# Patient Record
Sex: FEMALE | Race: WHITE | Employment: OTHER | URBAN - METROPOLITAN AREA
[De-identification: names, ages, dates, MRNs, and addresses within clinical notes are randomized per-mention and may not be internally consistent; named-entity substitution may affect disease eponyms.]

---

## 2021-03-08 ENCOUNTER — TRANSCRIBE ORDER (OUTPATIENT)
Dept: SCHEDULING | Age: 41
End: 2021-03-08

## 2021-03-08 DIAGNOSIS — Z12.31 SCREENING MAMMOGRAM FOR HIGH-RISK PATIENT: Primary | ICD-10-CM

## 2021-04-23 ENCOUNTER — HOSPITAL ENCOUNTER (OUTPATIENT)
Dept: MAMMOGRAPHY | Age: 41
Discharge: HOME | End: 2021-04-23
Payer: COMMERCIAL

## 2021-04-23 DIAGNOSIS — Z12.31 SCREENING MAMMOGRAM FOR HIGH-RISK PATIENT: ICD-10-CM

## 2021-04-23 PROCEDURE — 77067 SCR MAMMO BI INCL CAD: CPT

## 2022-04-18 ENCOUNTER — TRANSCRIBE ORDER (OUTPATIENT)
Dept: SCHEDULING | Age: 42
End: 2022-04-18

## 2022-04-18 DIAGNOSIS — Z12.31 VISIT FOR SCREENING MAMMOGRAM: Primary | ICD-10-CM

## 2022-06-13 ENCOUNTER — HOSPITAL ENCOUNTER (OUTPATIENT)
Dept: MAMMOGRAPHY | Age: 42
Discharge: HOME | End: 2022-06-13
Payer: COMMERCIAL

## 2022-06-13 DIAGNOSIS — Z12.31 VISIT FOR SCREENING MAMMOGRAM: ICD-10-CM

## 2022-06-13 PROCEDURE — 77067 SCR MAMMO BI INCL CAD: CPT

## 2023-07-14 ENCOUNTER — HOSPITAL ENCOUNTER (OUTPATIENT)
Facility: HOSPITAL | Age: 43
Discharge: HOME | End: 2023-07-14
Payer: COMMERCIAL

## 2023-07-14 DIAGNOSIS — Z12.31 VISIT FOR SCREENING MAMMOGRAM: ICD-10-CM

## 2023-07-14 PROCEDURE — 77063 BREAST TOMOSYNTHESIS BI: CPT

## 2023-09-29 ENCOUNTER — HOSPITAL ENCOUNTER (OUTPATIENT)
Facility: HOSPITAL | Age: 43
Setting detail: RECURRING SERIES
End: 2023-09-29
Payer: COMMERCIAL

## 2023-09-29 PROCEDURE — 97112 NEUROMUSCULAR REEDUCATION: CPT

## 2023-09-29 PROCEDURE — 97162 PT EVAL MOD COMPLEX 30 MIN: CPT

## 2023-09-29 PROCEDURE — 97535 SELF CARE MNGMENT TRAINING: CPT

## 2023-09-29 NOTE — THERAPY EVALUATION
activity limitation and / or participation in recreation  ;Presentation:  MEDIUM Complexity : Evolving with changing characteristics  ; Clinical Decision Making:  MEDIUM Complexity : FOTO score of 26-74 Overall Complexity Rating: MEDIUM  Problem List: decrease strength, impaired gait/balance, decrease ADL/functional abilities, and decrease activity tolerance   Treatment Plan may include any combination of the followin Therapeutic Exercise, 41059 Neuromuscular Re-Education, 93711 Manual Therapy, 73916 Therapeutic Activity, 01577 Self Care/Home Management, and 21463 Gait Training  Patient / Family readiness to learn indicated by: asking questions, trying to perform skills, and return verbalization   Persons(s) to be included in education: patient (P)  Barriers to Learning/Limitations: none  Measures taken if barriers to learning present: na  Patient Self Reported Health Status: good  Rehabilitation Potential: good    Short Term Goals: To be accomplished in 4 treatments. Patient will be independent with a progressive home exercise program without needed v.c. Patient will complete and present to PT a 72 hour bladder diary for analysis and establishment of appropriate intervention for remaining visits  Patient will be independent with bladder retraining techniques including pee symphony and KNACK to improve ADRIENNE by 50% per subjective report. Long Term Goals: To be accomplished in 12 treatments. Patient will demonstrate no loss of urine with cough/sneeze as performed in clinical setting  Patient will improve pelvic coordination and stability demonstrated throughout - Trendelenburg sign to improve tolerance for 30 minutes of exercise class without leakage. Patient will demonstrate 5/5 BLE strength to allow for home cleaning skills independently and without rest breaks needed    Frequency / Duration: Patient to be seen 1 time per week for 12 treatments.     Patient/ Caregiver education and instruction: Diagnosis, prognosis, self care, activity modification, and exercises   [x]  Plan of care has been reviewed with BRENDA Pabon PT, DPT       9/29/2023       9:28 AM    ===================================================================  I certify that the above Therapy Services are being furnished while the patient is under my care. I agree with the treatment plan and certify that this therapy is necessary. Physician's Signature:_________________________   DATE:_________   TIME:________                           Referral, Self    Son Hernandez MD    ** Signature, Date and Time must be completed for valid certification **  Please sign and fax to [phone].   Thank you

## 2023-10-03 ENCOUNTER — HOSPITAL ENCOUNTER (OUTPATIENT)
Facility: HOSPITAL | Age: 43
Setting detail: RECURRING SERIES
Discharge: HOME | End: 2023-10-06
Payer: COMMERCIAL

## 2023-10-03 PROCEDURE — 97112 NEUROMUSCULAR REEDUCATION: CPT

## 2023-10-03 PROCEDURE — 97535 SELF CARE MNGMENT TRAINING: CPT

## 2023-10-03 NOTE — PROGRESS NOTES
individual muscles and awareness of position of extremities in order to progress to PLOF and address remaining functional goals. (see flow sheet as applicable)     Details if applicable:                    41 41    Total Total     [x]  Patient Education billed concurrently with other procedures   [x] Review HEP    [] Progressed/Changed HEP, detail:    [] Other detail:       Other Objective/Functional Measures    Pain Level at end of session (0-10 scale): 0    Assessment     Min cues for form throughout. Some instability with quadruped arm lift but improved with repetitions. Updated HEP provided. Agreeable to continue urge drills and knack with intentional relaxation. Patient will continue to benefit from skilled PT / OT services to modify and progress therapeutic interventions, analyze and address functional mobility deficits, analyze and address strength deficits, analyze and cue for proper movement patterns, and instruct in home and community integration to address functional deficits and attain remaining goals. Progress toward goals / Updated goals:  []  See Progress Note/Recertification    Short Term Goals: To be accomplished in 4 treatments. Patient will be independent with a progressive home exercise program without needed v.c. Patient will complete and present to PT a 72 hour bladder diary for analysis and establishment of appropriate intervention for remaining visits  Patient will be independent with bladder retraining techniques including pee symphony and KNACK to improve ADRIENNE by 50% per subjective report. Long Term Goals: To be accomplished in 12 treatments. Patient will demonstrate no loss of urine with cough/sneeze as performed in clinical setting  Patient will improve pelvic coordination and stability demonstrated throughout - Trendelenburg sign to improve tolerance for 30 minutes of exercise class without leakage.     Patient will demonstrate 5/5 BLE strength to allow for home cleaning

## 2023-10-18 ENCOUNTER — HOSPITAL ENCOUNTER (OUTPATIENT)
Facility: HOSPITAL | Age: 43
Setting detail: RECURRING SERIES
Discharge: HOME | End: 2023-10-21
Payer: COMMERCIAL

## 2023-10-18 PROCEDURE — 97535 SELF CARE MNGMENT TRAINING: CPT

## 2023-10-18 PROCEDURE — 97112 NEUROMUSCULAR REEDUCATION: CPT

## 2023-10-18 NOTE — PROGRESS NOTES
PHYSICAL THERAPY - MEDICARE DAILY TREATMENT NOTE (updated 3/23)    Date: 10/18/2023        Patient Name:  Irish Carranza :  1980   Medical   Diagnosis:  Urinary incontinence [R32] Treatment Diagnosis:  M62.838  OTHER MUSCLE WEAKNESS and N39.46  MIXED INCONTINENCE    Referral Source:  Referral, Self Insurance:   Payor: Stanley Pace / Plan: Gio Jose / Product Type: *No Product type* /                   Patient  verified yes     Visit #   Current  / Total 3 4   Time   In / Out 948a 1031a   Total Treatment Time 43   Total Timed Codes 43   1:1 Treatment Time 37      [facility] Totals Reminder:  bill using total billable   min of TIMED therapeutic procedures and modalities. 8-22 min = 1 unit; 23-37 min = 2 units; 38-52 min = 3 units; 53-67 min = 4 units; 68-82 min = 5 units        SUBJECTIVE    Pain Level (0-10 scale): 0    Any medication changes, allergies to medications, adverse drug reactions, diagnosis change, or new procedure performed?: [x] No    [] Yes (see summary sheet for update)  Medications: Verified on Patient Summary List    Subjective functional status/changes:       Notices herself thinking about breathing more often. Leaking on 13-15 days of cycle. Jumping in LUCIANA still towards the end of a class. Not noticing any differences Legs still feel tight even with stretches. Tried heel raises and feels they are more effective than kegels. OBJECTIVE    Therapeutic Procedures: Tx Min Billable or 1:1 Min (if diff from Tx Min) Procedure, Rationale, Specifics   10 10 39759 Self Care/Home Management (timed):  improve patient knowledge and understanding of pain reducing techniques, posture/ergonomics, activity modification, diagnosis/prognosis, and physical therapy expectations, procedures and progression  to improve patient's ability to progress to PLOF and address remaining functional goals.   (see flow sheet as applicable)     Details if applicable:  checkins   [phone] [id number] Neuromuscular

## 2023-11-01 ENCOUNTER — HOSPITAL ENCOUNTER (OUTPATIENT)
Facility: HOSPITAL | Age: 43
Setting detail: RECURRING SERIES
Discharge: HOME | End: 2023-11-04
Payer: COMMERCIAL

## 2023-11-01 PROCEDURE — 97535 SELF CARE MNGMENT TRAINING: CPT

## 2023-11-01 PROCEDURE — 97112 NEUROMUSCULAR REEDUCATION: CPT

## 2023-11-01 NOTE — PROGRESS NOTES
PHYSICAL THERAPY - MEDICARE DAILY TREATMENT NOTE (updated 3/23)    Date: 2023        Patient Name:  Shivani Hernandez :  1980   Medical   Diagnosis:  Urinary incontinence [R32] Treatment Diagnosis:  M62.838  OTHER MUSCLE WEAKNESS and N39.46  MIXED INCONTINENCE    Referral Source:  Referral, Self Insurance:   Payor: [place] / Plan: Jerry Beverage / Product Type: *No Product type* /                   Patient  verified yes     Visit #   Current  / Total 4 7   Time   In / Out 950a 1034a   Total Treatment Time 44   Total Timed Codes 44   1:1 Treatment Time 40      [facility] Totals Reminder:  bill using total billable   min of TIMED therapeutic procedures and modalities. 8-22 min = 1 unit; 23-37 min = 2 units; 38-52 min = 3 units; 53-67 min = 4 units; 68-82 min = 5 units        SUBJECTIVE    Pain Level (0-10 scale): 0    Any medication changes, allergies to medications, adverse drug reactions, diagnosis change, or new procedure performed?: [x] No    [] Yes (see summary sheet for update)  Medications: Verified on Patient Summary List    Subjective functional status/changes:       Double jumping is still causing leakage. Lost her bladder after waiting 5 hours and went into the bathroom then again less amount. Able to do check ins successfully. Stretches are easier. OBJECTIVE    Therapeutic Procedures: Tx Min Billable or 1:1 Min (if diff from Tx Min) Procedure, Rationale, Specifics   10 10 08117 Self Care/Home Management (timed):  improve patient knowledge and understanding of pain reducing techniques, posture/ergonomics, activity modification, diagnosis/prognosis, and physical therapy expectations, procedures and progression  to improve patient's ability to progress to PLOF and address remaining functional goals.   (see flow sheet as applicable)     Details if applicable: bladder retraining, urge drills, pelvic brace   34 90 40102 Neuromuscular Re-Education (timed):  improve balance,

## 2023-11-08 ENCOUNTER — HOSPITAL ENCOUNTER (OUTPATIENT)
Facility: HOSPITAL | Age: 43
Setting detail: RECURRING SERIES
Discharge: HOME | End: 2023-11-11
Payer: COMMERCIAL

## 2023-11-08 PROCEDURE — 97112 NEUROMUSCULAR REEDUCATION: CPT

## 2023-11-08 NOTE — PROGRESS NOTES
to allow for home cleaning skills independently and without rest breaks needed    PLAN  Yes  Continue plan of care  Re-Cert Due: na  [x]  Upgrade activities as tolerated  []  Discharge due to :  []  Other:    Rebecca Lima, PT, DPT       11/8/2023       9:49 AM

## 2023-11-30 ENCOUNTER — HOSPITAL ENCOUNTER (OUTPATIENT)
Facility: HOSPITAL | Age: 43
Setting detail: RECURRING SERIES
End: 2023-11-30
Payer: COMMERCIAL

## 2023-11-30 PROCEDURE — 97112 NEUROMUSCULAR REEDUCATION: CPT

## 2023-11-30 PROCEDURE — 97535 SELF CARE MNGMENT TRAINING: CPT

## 2023-11-30 NOTE — PROGRESS NOTES
PHYSICAL THERAPY - MEDICARE DAILY TREATMENT NOTE (updated 3/23)    Date: 2023        Patient Name:  Tanisha Williamson :  1980   Medical   Diagnosis:  Urinary incontinence [R32] Treatment Diagnosis:  M62.838  OTHER MUSCLE WEAKNESS and N39.46  MIXED INCONTINENCE    Referral Source:  Rosine Scheuermann* Insurance:   Payor: Taiwo Colbert / Plan: Titi Nazario / Product Type: *No Product type* /                   Patient  verified yes     Visit #   Current  / Total 6 awaiting   Time   In / Out 1111a 1154a   Total Treatment Time 43   Total Timed Codes 43   1:1 Treatment Time 37      [facility] Totals Reminder:  bill using total billable   min of TIMED therapeutic procedures and modalities. 8-22 min = 1 unit; 23-37 min = 2 units; 38-52 min = 3 units; 53-67 min = 4 units; 68-82 min = 5 units        SUBJECTIVE    Pain Level (0-10 scale): 0    Any medication changes, allergies to medications, adverse drug reactions, diagnosis change, or new procedure performed?: [x] No    [] Yes (see summary sheet for update)  Medications: Verified on Patient Summary List    Subjective functional status/changes:       Had trouble with quadruped activity. Had 1 instance of ADRIENNE. No other bladder problems. Wont sit to pee unless she's in her bathroom. OBJECTIVE    Therapeutic Procedures: Tx Min Billable or 1:1 Min (if diff from Tx Min) Procedure, Rationale, Specifics   15 15 91325 Self Care/Home Management (timed):  improve patient knowledge and understanding of pain reducing techniques, posture/ergonomics, activity modification, diagnosis/prognosis, and physical therapy expectations, procedures and progression  to improve patient's ability to progress to PLOF and address remaining functional goals.   (see flow sheet as applicable)     Details if applicable: bladder retraining- safe exercise progressions (breath)   28 02 07182 Neuromuscular Re-Education (timed):  improve balance, coordination, kinesthetic sense, with a progressive home exercise program without needed v.c.  - MET  Patient will complete and present to PT a 72 hour bladder diary for analysis and establishment of appropriate intervention for remaining visits- NT  Patient will be independent with bladder retraining techniques including pee symphony and KNACK to improve ADRIENNE by 50% per subjective report. - IN PROGRESS      Long Term Goals: To be accomplished in 12 treatments.   Patient will demonstrate no loss of urine with cough/sneeze as performed in clinical setting- IN PROGRESS  Patient will improve pelvic coordination and stability demonstrated throughout (-) Trendelenburg sign to improve tolerance for 30 minutes of exercise class without leakage. - MET  Patient will demonstrate 5/5 BLE strength to allow for home cleaning skills independently and without rest breaks needed - IN PROGRESS    PLAN  Yes  Continue plan of care  Re-Cert Due: na  [x]  Upgrade activities as tolerated  []  Discharge due to :  []  Other:    Burke Bower, PT, DPT       11/30/2023       11:13 AM

## 2023-11-30 NOTE — PROGRESS NOTES
Physical Therapy at [facility],   a part of [address] [place], [place] [address]  Phone: [phone]  Fax: [phone]  PHYSICAL THERAPY PROGRESS NOTE  Patient Name:  Silvano Munroe :  1980   Treatment/Medical Diagnosis: Urinary incontinence [R32]   Referral Source:  Lester Lu*     Date of Initial Visit:  23 Attended Visits:  6 Missed Visits:  0     SUMMARY OF TREATMENT/ASSESSMENT:    Neris Hoffmann has completed 6 pelvic PT sessions including patient education, therapeutic exercise, and neuromuscular re-education to address ADRIENNE with some progress towards goals. She demonstrates improved LE MMT, lower abdominal MMT, MAHESH, and pelvic stability but continues to report urinary urgency or ADRIENNE during jumping activity with impaired breath coordination. She would benefit from continued skilled pelvic PT to address remaining goals and allow for participation in community events without fear. CURRENT STATUS    Lower Quarter MMT              R                      L     Hip Flex                                   5/5                 5/5 (improved)  Hip ABD                                  4+/5                 4+/5 (improved)  Hip ADD                                  4/5                   4/5     Abdominals: 4/5 (improved)     MAHESH:              Above umbilicus: 0              At umbilicus: 0 (improved)              Below umbilicus: 0     Special Tests:              Trendelenburg: R - L- (improved)              ASLR: R - L + (improved R)     Functional Outcome Measure: PFDI Urinary: 4 (improved from 8), FOTO Urinary: 63 (improved from 58)    Short Term Goals: To be accomplished in 4 treatments.   Patient will be independent with a progressive home exercise program without needed v.c.  - MET  Patient will complete and present to PT a 72 hour bladder diary for analysis and establishment of appropriate intervention for remaining visits- NT  Patient will be independent with bladder retraining techniques including pee symphony and KNACK to improve ADRIENNE by 50% per subjective report. - IN PROGRESS      Long Term Goals: To be accomplished in 12 treatments. Patient will demonstrate no loss of urine with cough/sneeze as performed in clinical setting- -IN PROGRESS  Patient will improve pelvic coordination and stability demonstrated throughout (-) Trendelenburg sign to improve tolerance for 30 minutes of exercise class without leakage. - MET  Patient will demonstrate 5/5 BLE strength to allow for home cleaning skills independently and without rest breaks needed- IN PROGRESS    NEW GOAL:   Patient will tolerate internal pelvic floor muscle exam for analysis and establishment of appropriate intervention for remaining visits    RECOMMENDATIONS    Continue biweekly    Gerhardt Neighbors, PT, DPT       11/30/2023       11:32 AM    If you have any questions/comments please contact us directly at [phone]. Thank you for allowing us to assist in the care of your patient.

## 2024-01-03 ENCOUNTER — APPOINTMENT (OUTPATIENT)
Facility: HOSPITAL | Age: 44
End: 2024-01-03
Payer: COMMERCIAL

## 2024-01-17 ENCOUNTER — APPOINTMENT (OUTPATIENT)
Facility: HOSPITAL | Age: 44
End: 2024-01-17
Payer: COMMERCIAL

## 2024-01-24 ENCOUNTER — APPOINTMENT (OUTPATIENT)
Facility: HOSPITAL | Age: 44
End: 2024-01-24
Payer: COMMERCIAL

## 2024-01-31 ENCOUNTER — APPOINTMENT (OUTPATIENT)
Facility: HOSPITAL | Age: 44
End: 2024-01-31
Payer: COMMERCIAL

## 2024-02-01 ENCOUNTER — HOSPITAL ENCOUNTER (OUTPATIENT)
Facility: HOSPITAL | Age: 44
Setting detail: RECURRING SERIES
Discharge: HOME | End: 2024-02-04
Payer: COMMERCIAL

## 2024-02-01 PROCEDURE — 97112 NEUROMUSCULAR REEDUCATION: CPT

## 2024-02-01 NOTE — PROGRESS NOTES
PHYSICAL THERAPY - MEDICARE DAILY TREATMENT NOTE (updated 3/23)    Date: 2024        Patient Name:  Anastasia Machuca-José Miguel :  1980   Medical   Diagnosis:  Urinary incontinence [R32] Treatment Diagnosis:  M62.838  OTHER MUSCLE WEAKNESS and N39.46  MIXED INCONTINENCE    Referral Source:  Bhavya Mcnair* Insurance:   Payor: [facility] / Plan: [facility] [employer] / Product Type: *No Product type* /                   Patient  verified yes     Visit #   Current  / Total 8 10   Time   In / Out 1245p 130p   Total Treatment Time 45   Total Timed Codes 45   1:1 Treatment Time 45       [facility] Totals Reminder:  bill using total billable   min of TIMED therapeutic procedures and modalities.   8-22 min = 1 unit; 23-37 min = 2 units; 38-52 min = 3 units; 53-67 min = 4 units; 68-82 min = 5 units        SUBJECTIVE    Pain Level (0-10 scale): 0    Any medication changes, allergies to medications, adverse drug reactions, diagnosis change, or new procedure performed?: [x] No    [] Yes (see summary sheet for update)  Medications: Verified on Patient Summary List    Subjective functional status/changes:       Has not had a problem with sneezing. Had the sensation of leaking with one LUCIANA class but didn't actually leak. Quick flicks are easy, endurance contractions are more difficult. Mountain climbers are better.     OBJECTIVE    Therapeutic Procedures:  Tx Min Billable or 1:1 Min (if diff from Tx Min) Procedure, Rationale, Specifics     86025 Self Care/Home Management (timed):  improve patient knowledge and understanding of pain reducing techniques, posture/ergonomics, activity modification, diagnosis/prognosis, and physical therapy expectations, procedures and progression  to improve patient's ability to progress to PLOF and address remaining functional goals.  (see flow sheet as applicable)     Details if applicable: bladder retraining- safe exercise progressions (breath)   45 53 14382 Neuromuscular Re-Education

## 2024-02-08 ENCOUNTER — APPOINTMENT (OUTPATIENT)
Facility: HOSPITAL | Age: 44
End: 2024-02-08
Payer: COMMERCIAL

## 2024-02-15 ENCOUNTER — APPOINTMENT (OUTPATIENT)
Facility: HOSPITAL | Age: 44
End: 2024-02-15
Payer: COMMERCIAL

## 2024-06-19 ENCOUNTER — TELEPHONE (OUTPATIENT)
Age: 44
End: 2024-06-19

## 2024-06-19 NOTE — TELEPHONE ENCOUNTER
1st call about referral. Left pt a vm to call & schedule. The referral was sent from Dr Ashkan Lynn. 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

## 2024-07-02 ENCOUNTER — TELEPHONE (OUTPATIENT)
Age: 44
End: 2024-07-02

## 2024-07-09 ENCOUNTER — TELEPHONE (OUTPATIENT)
Age: 44
End: 2024-07-09

## 2025-03-17 ENCOUNTER — TRANSCRIBE ORDERS (OUTPATIENT)
Facility: HOSPITAL | Age: 45
End: 2025-03-17

## 2025-03-17 DIAGNOSIS — Z12.31 OTHER SCREENING MAMMOGRAM: Primary | ICD-10-CM

## 2025-04-29 ENCOUNTER — HOSPITAL ENCOUNTER (OUTPATIENT)
Facility: HOSPITAL | Age: 45
Discharge: HOME | End: 2025-05-02
Payer: COMMERCIAL

## 2025-04-29 VITALS — BODY MASS INDEX: 24.48 KG/M2 | WEIGHT: 161 LBS

## 2025-04-29 DIAGNOSIS — Z12.31 OTHER SCREENING MAMMOGRAM: ICD-10-CM

## 2025-04-29 PROCEDURE — 77063 BREAST TOMOSYNTHESIS BI: CPT
